# Patient Record
Sex: MALE | Race: WHITE | NOT HISPANIC OR LATINO | Employment: FULL TIME | URBAN - NONMETROPOLITAN AREA
[De-identification: names, ages, dates, MRNs, and addresses within clinical notes are randomized per-mention and may not be internally consistent; named-entity substitution may affect disease eponyms.]

---

## 2024-05-17 ENCOUNTER — APPOINTMENT (EMERGENCY)
Dept: RADIOLOGY | Facility: HOSPITAL | Age: 34
End: 2024-05-17
Payer: COMMERCIAL

## 2024-05-17 ENCOUNTER — HOSPITAL ENCOUNTER (EMERGENCY)
Facility: HOSPITAL | Age: 34
Discharge: HOME | End: 2024-05-17
Attending: EMERGENCY MEDICINE
Payer: COMMERCIAL

## 2024-05-17 VITALS
RESPIRATION RATE: 20 BRPM | HEIGHT: 66 IN | WEIGHT: 160.94 LBS | OXYGEN SATURATION: 100 % | DIASTOLIC BLOOD PRESSURE: 73 MMHG | SYSTOLIC BLOOD PRESSURE: 116 MMHG | HEART RATE: 63 BPM | BODY MASS INDEX: 25.86 KG/M2 | TEMPERATURE: 97.5 F

## 2024-05-17 DIAGNOSIS — R55 SYNCOPE: Primary | ICD-10-CM

## 2024-05-17 LAB
ALBUMIN SERPL BCP-MCNC: 4.7 G/DL (ref 3.5–5)
ALP SERPL-CCNC: 67 U/L (ref 34–104)
ALT SERPL W P-5'-P-CCNC: 27 U/L (ref 7–52)
ANION GAP SERPL CALCULATED.3IONS-SCNC: 8 MMOL/L (ref 4–13)
AST SERPL W P-5'-P-CCNC: 20 U/L (ref 13–39)
ATRIAL RATE: 54 BPM
BASOPHILS # BLD AUTO: 0.03 THOUSANDS/ÂΜL (ref 0–0.1)
BASOPHILS NFR BLD AUTO: 1 % (ref 0–1)
BILIRUB SERPL-MCNC: 0.62 MG/DL (ref 0.2–1)
BUN SERPL-MCNC: 17 MG/DL (ref 5–25)
CALCIUM SERPL-MCNC: 9.4 MG/DL (ref 8.4–10.2)
CARDIAC TROPONIN I PNL SERPL HS: <2 NG/L
CHLORIDE SERPL-SCNC: 104 MMOL/L (ref 96–108)
CO2 SERPL-SCNC: 26 MMOL/L (ref 21–32)
CREAT SERPL-MCNC: 0.85 MG/DL (ref 0.6–1.3)
EOSINOPHIL # BLD AUTO: 0.13 THOUSAND/ÂΜL (ref 0–0.61)
EOSINOPHIL NFR BLD AUTO: 3 % (ref 0–6)
ERYTHROCYTE [DISTWIDTH] IN BLOOD BY AUTOMATED COUNT: 11.9 % (ref 11.6–15.1)
GFR SERPL CREATININE-BSD FRML MDRD: 114 ML/MIN/1.73SQ M
GLUCOSE SERPL-MCNC: 101 MG/DL (ref 65–140)
HCT VFR BLD AUTO: 44.2 % (ref 36.5–49.3)
HGB BLD-MCNC: 14.9 G/DL (ref 12–17)
IMM GRANULOCYTES # BLD AUTO: 0.01 THOUSAND/UL (ref 0–0.2)
IMM GRANULOCYTES NFR BLD AUTO: 0 % (ref 0–2)
LYMPHOCYTES # BLD AUTO: 2.44 THOUSANDS/ÂΜL (ref 0.6–4.47)
LYMPHOCYTES NFR BLD AUTO: 48 % (ref 14–44)
MAGNESIUM SERPL-MCNC: 2 MG/DL (ref 1.9–2.7)
MCH RBC QN AUTO: 29.4 PG (ref 26.8–34.3)
MCHC RBC AUTO-ENTMCNC: 33.7 G/DL (ref 31.4–37.4)
MCV RBC AUTO: 87 FL (ref 82–98)
MONOCYTES # BLD AUTO: 0.37 THOUSAND/ÂΜL (ref 0.17–1.22)
MONOCYTES NFR BLD AUTO: 7 % (ref 4–12)
NEUTROPHILS # BLD AUTO: 2.08 THOUSANDS/ÂΜL (ref 1.85–7.62)
NEUTS SEG NFR BLD AUTO: 41 % (ref 43–75)
NRBC BLD AUTO-RTO: 0 /100 WBCS
P AXIS: 40 DEGREES
PLATELET # BLD AUTO: 233 THOUSANDS/UL (ref 149–390)
PMV BLD AUTO: 8.5 FL (ref 8.9–12.7)
POTASSIUM SERPL-SCNC: 4.3 MMOL/L (ref 3.5–5.3)
PR INTERVAL: 128 MS
PROT SERPL-MCNC: 7.1 G/DL (ref 6.4–8.4)
QRS AXIS: 49 DEGREES
QRSD INTERVAL: 104 MS
QT INTERVAL: 392 MS
QTC INTERVAL: 371 MS
RBC # BLD AUTO: 5.06 MILLION/UL (ref 3.88–5.62)
SODIUM SERPL-SCNC: 138 MMOL/L (ref 135–147)
T WAVE AXIS: 28 DEGREES
VENTRICULAR RATE: 54 BPM
WBC # BLD AUTO: 5.06 THOUSAND/UL (ref 4.31–10.16)

## 2024-05-17 PROCEDURE — 93005 ELECTROCARDIOGRAM TRACING: CPT

## 2024-05-17 PROCEDURE — 99285 EMERGENCY DEPT VISIT HI MDM: CPT | Performed by: PHYSICIAN ASSISTANT

## 2024-05-17 PROCEDURE — 83735 ASSAY OF MAGNESIUM: CPT | Performed by: PHYSICIAN ASSISTANT

## 2024-05-17 PROCEDURE — 80053 COMPREHEN METABOLIC PANEL: CPT | Performed by: PHYSICIAN ASSISTANT

## 2024-05-17 PROCEDURE — 99284 EMERGENCY DEPT VISIT MOD MDM: CPT

## 2024-05-17 PROCEDURE — 36415 COLL VENOUS BLD VENIPUNCTURE: CPT | Performed by: PHYSICIAN ASSISTANT

## 2024-05-17 PROCEDURE — 84484 ASSAY OF TROPONIN QUANT: CPT | Performed by: PHYSICIAN ASSISTANT

## 2024-05-17 PROCEDURE — 71045 X-RAY EXAM CHEST 1 VIEW: CPT

## 2024-05-17 PROCEDURE — 85025 COMPLETE CBC W/AUTO DIFF WBC: CPT | Performed by: PHYSICIAN ASSISTANT

## 2024-05-17 NOTE — Clinical Note
Damion Leach was seen and treated in our emergency department on 5/17/2024.                Diagnosis:     Damion  may return to work on return date.    He may return on this date: 05/20/2024         If you have any questions or concerns, please don't hesitate to call.      Sandy Ortez PA-C    ______________________________           _______________          _______________  Hospital Representative                              Date                                Time

## 2024-05-17 NOTE — ED PROVIDER NOTES
"History  Chief Complaint   Patient presents with    Syncope     Pt was drinking debbie slushee and had \" brain freeze,\" pt then had syncopal episode lasting 2-3 minutes      33 year old male presents to the for evaluation s/p syncopal episode. States he was eating breakfast at a cafe. Had a waffel and a debbie slush. Notes he got a brain freeze and then s/o reports he passed out. States he did feel lightheaded and told s/o he was lightheaded. She reports he patietn \"slumped over\" and states eyes rolled back. States she called his name and he didn't reposed. States patient was pale. Notes then patient \"came to.\"  Significant other reports she was not looking o'clock but this felt last 1 to 3 minutes.  There was no tongue biting.  No abnormal shaking movements.  No seizure-like activity reported.  No history of seizures.  No loss of bowel or bladder control.  Patient states after he was confused on what happened but was never confused on where he was.  No reported facial droop or speech changes.  Patient has ambulated since.  Denies any weakness or numbness.  Denies any headaches or visual changes.  He denies any chest pain palpitations.  Denies any drug or alcohol use.        None       History reviewed. No pertinent past medical history.    History reviewed. No pertinent surgical history.    History reviewed. No pertinent family history.  I have reviewed and agree with the history as documented.    E-Cigarette/Vaping     E-Cigarette/Vaping Substances     Social History     Tobacco Use    Smoking status: Never    Smokeless tobacco: Never   Substance Use Topics    Alcohol use: Not Currently    Drug use: Not Currently       Review of Systems   Constitutional: Negative.    Eyes:  Negative for visual disturbance.   Respiratory: Negative.     Cardiovascular: Negative.    Gastrointestinal: Negative.    Genitourinary: Negative.    Musculoskeletal: Negative.    Skin:  Positive for pallor.   Neurological:  Positive for " syncope and light-headedness. Negative for tremors, seizures, speech difficulty, weakness and headaches.   All other systems reviewed and are negative.      Physical Exam  Physical Exam  Vitals and nursing note reviewed.   Constitutional:       General: He is not in acute distress.     Appearance: Normal appearance. He is not ill-appearing, toxic-appearing or diaphoretic.   HENT:      Head: Normocephalic.      Nose: Nose normal.   Eyes:      Extraocular Movements: Extraocular movements intact.      Conjunctiva/sclera: Conjunctivae normal.      Pupils: Pupils are equal, round, and reactive to light.   Cardiovascular:      Rate and Rhythm: Normal rate and regular rhythm.   Pulmonary:      Effort: Pulmonary effort is normal.      Breath sounds: Normal breath sounds. No stridor. No wheezing or rales.   Chest:      Chest wall: No tenderness.   Abdominal:      General: Abdomen is flat. Bowel sounds are normal. There is no distension.      Palpations: Abdomen is soft.      Tenderness: There is no abdominal tenderness.   Musculoskeletal:         General: Normal range of motion.      Cervical back: Normal range of motion.   Skin:     General: Skin is warm and dry.      Findings: No bruising, erythema or rash.   Neurological:      General: No focal deficit present.      Mental Status: He is alert and oriented to person, place, and time.      Sensory: No sensory deficit.      Motor: No weakness.      Coordination: Coordination normal.      Gait: Gait normal.   Psychiatric:         Mood and Affect: Mood normal.         Vital Signs  ED Triage Vitals [05/17/24 0908]   Temperature Pulse Respirations Blood Pressure SpO2   97.5 °F (36.4 °C) 57 20 132/73 100 %      Temp Source Heart Rate Source Patient Position - Orthostatic VS BP Location FiO2 (%)   Temporal Monitor Sitting Left arm --      Pain Score       No Pain           Vitals:    05/17/24 0908 05/17/24 1015   BP: 132/73 116/73   Pulse: 57 63   Patient Position - Orthostatic  VS: Sitting Lying         Visual Acuity      ED Medications  Medications - No data to display    Diagnostic Studies  Results Reviewed       Procedure Component Value Units Date/Time    HS Troponin I 4hr [[id number]]     Lab Status: No result Specimen: Blood     HS Troponin 0hr (reflex protocol) [[id number]]  (Normal) Collected: 05/17/24 0923    Lab Status: Final result Specimen: Blood from Hand, Left Updated: 05/17/24 1002     hs TnI 0hr <2 ng/L     HS Troponin I 2hr [[id number]]     Lab Status: No result Specimen: Blood     Comprehensive metabolic panel [[id number]] Collected: 05/17/24 0923    Lab Status: Final result Specimen: Blood from Hand, Left Updated: 05/17/24 1000     Sodium 138 mmol/L      Potassium 4.3 mmol/L      Chloride 104 mmol/L      CO2 26 mmol/L      ANION GAP 8 mmol/L      BUN 17 mg/dL      Creatinine 0.85 mg/dL      Glucose 101 mg/dL      Calcium 9.4 mg/dL      AST 20 U/L      ALT 27 U/L      Alkaline Phosphatase 67 U/L      Total Protein 7.1 g/dL      Albumin 4.7 g/dL      Total Bilirubin 0.62 mg/dL      eGFR 114 ml/min/1.73sq m     Narrative:      [employer] guidelines for Chronic Kidney Disease (CKD):     Stage 1 with normal or high GFR (GFR > 90 mL/min/1.73 square meters)    Stage 2 Mild CKD (GFR = 60-89 mL/min/1.73 square meters)    Stage 3A Moderate CKD (GFR = 45-59 mL/min/1.73 square meters)    Stage 3B Moderate CKD (GFR = 30-44 mL/min/1.73 square meters)    Stage 4 Severe CKD (GFR = 15-29 mL/min/1.73 square meters)    Stage 5 End Stage CKD (GFR <15 mL/min/1.73 square meters)  Note: GFR calculation is accurate only with a steady state creatinine    Magnesium [[id number]]  (Normal) Collected: 05/17/24 0923    Lab Status: Final result Specimen: Blood from Hand, Left Updated: 05/17/24 1000     Magnesium 2.0 mg/dL     CBC and differential [[id number]]  (Abnormal) Collected: 05/17/24 0923    Lab Status: Final result Specimen: Blood from Hand, Left Updated: 05/17/24 0928      WBC 5.06 Thousand/uL      RBC 5.06 Million/uL      Hemoglobin 14.9 g/dL      Hematocrit 44.2 %      MCV 87 fL      MCH 29.4 pg      MCHC 33.7 g/dL      RDW 11.9 %      MPV 8.5 fL      Platelets 233 Thousands/uL      nRBC 0 /100 WBCs      Segmented % 41 %      Immature Grans % 0 %      Lymphocytes % 48 %      Monocytes % 7 %      Eosinophils Relative 3 %      Basophils Relative 1 %      Absolute Neutrophils 2.08 Thousands/µL      Absolute Immature Grans 0.01 Thousand/uL      Absolute Lymphocytes 2.44 Thousands/µL      Absolute Monocytes 0.37 Thousand/µL      Eosinophils Absolute 0.13 Thousand/µL      Basophils Absolute 0.03 Thousands/µL                    XR chest 1 view portable   ED Interpretation by Garrick Gonsalez MD (05/17 0955)   NAD                 Procedures  ECG 12 Lead Documentation Only    Date/Time: 5/17/2024 9:39 AM    Performed by: Sandy Ortez PA-C  Authorized by: Sandy Ortez PA-C    Patient location:  ED  Interpretation:     Interpretation: non-specific    Rate:     ECG rate:  54    ECG rate assessment: bradycardic    Rhythm:     Rhythm: sinus bradycardia    Ectopy:     Ectopy: none    QRS:     QRS axis:  Normal    QRS intervals:  Normal  Conduction:     Conduction: normal    ST segments:     ST segments:  Normal  T waves:     T waves: normal             ED Course  ED Course as of 05/17/24 1151   Fri May 17, 2024   1000 Patient reevaluated.  Resting comfortably.   1004 hs TnI 0hr: <2   1004 MAGNESIUM: 2.0   1004 Comprehensive metabolic panel  Unremarkable   1004 Interpretation of chest x-ray is negative for acute cardiopulmonary findings.   1016 I discussed all results and findings with patient and significant other.  We discussed continued symptomatic treatment at home strict return precautions and appropriate follow-up and both verbalized understanding.  Patient was clinically and hemodynamically stable for discharge                               SBIRT 22yo+      Flowsheet Row Most Recent  Value   Initial Alcohol Screen: US AUDIT-C     1. How often do you have a drink containing alcohol? 0 Filed at: 05/17/2024 0908   2. How many drinks containing alcohol do you have on a typical day you are drinking?  0 Filed at: 05/17/2024 0908   3a. Male UNDER 65: How often do you have five or more drinks on one occasion? 0 Filed at: 05/17/2024 0908   Audit-C Score 0 Filed at: 05/17/2024 0908   JOHN: How many times in the past year have you...    Used an illegal drug or used a prescription medication for non-medical reasons? Never Filed at: 05/17/2024 0908                      Medical Decision Making  33-year-old male presented to the emergency department for evaluation status post syncopal episode.  Vitals and medical record reviewed.  Patient at risk for the following but not limited to vasovagal episode, orthostatic hypotension, electrolyte abnormality, arrhythmia, MI.  Patient denies any chest pain, EKG was nonischemic, troponin within normal limits, low concern for MI.  Patient remained normal sinus rhythm on the monitor.  Lower concern for arrhythmia.  No significant electrolyte abnormalities.  Patient was able to tolerate p.o. intake and ambulate without any difficulty.  We discussed likely vasovagal episode.  We discussed symptomatic treatment at home return precautions and follow-up and patient verbalized understanding.  He was clinically and hemodynamically stable for discharge    Problems Addressed:  Syncope: acute illness or injury    Amount and/or Complexity of Data Reviewed  Independent Historian:      Details: Significant other  Labs: ordered. Decision-making details documented in ED Course.  Radiology: ordered and independent interpretation performed.             Disposition  Final diagnoses:   Syncope     Time reflects when diagnosis was documented in both MDM as applicable and the Disposition within this note       Time User Action Codes Description Comment    5/17/2024 10:06 AM Sandy Ortez Add  [R55] Syncope           ED Disposition       ED Disposition   Discharge    Condition   Stable    Date/Time   Fri May 17, 2024 1006    Comment   Damion Leach discharge to home/self care.                   Follow-up Information       Follow up With Specialties Details Why Contact Info    [facility]   [address]  [address]  [place] [address] [phone]              There are no discharge medications for this patient.      No discharge procedures on file.    PDMP Review       None            ED Provider  Electronically Signed by             Sandy Ortez PA-C  05/17/24 9566

## 2024-05-17 NOTE — DISCHARGE INSTRUCTIONS
Please follow up with your PCP  Stay well hydrated  Return to the ED with new or worsening symptoms